# Patient Record
Sex: FEMALE | Race: WHITE | Employment: OTHER | URBAN - METROPOLITAN AREA
[De-identification: names, ages, dates, MRNs, and addresses within clinical notes are randomized per-mention and may not be internally consistent; named-entity substitution may affect disease eponyms.]

---

## 2018-12-17 ENCOUNTER — OFFICE VISIT (OUTPATIENT)
Dept: HEMATOLOGY | Age: 68
End: 2018-12-17

## 2018-12-17 VITALS
TEMPERATURE: 97.6 F | HEIGHT: 59 IN | BODY MASS INDEX: 37.09 KG/M2 | OXYGEN SATURATION: 93 % | DIASTOLIC BLOOD PRESSURE: 76 MMHG | SYSTOLIC BLOOD PRESSURE: 151 MMHG | HEART RATE: 72 BPM | WEIGHT: 184 LBS

## 2018-12-17 DIAGNOSIS — Z95.1 HX OF CABG: ICD-10-CM

## 2018-12-17 DIAGNOSIS — K74.3 PRIMARY BILIARY CHOLANGITIS (HCC): Primary | ICD-10-CM

## 2018-12-17 PROBLEM — G47.30 SLEEP APNEA: Status: ACTIVE | Noted: 2018-12-17

## 2018-12-17 PROBLEM — E78.00 HYPERCHOLESTEROLEMIA: Status: ACTIVE | Noted: 2018-12-17

## 2018-12-17 PROBLEM — L40.9 PSORIASIS: Status: ACTIVE | Noted: 2018-12-17

## 2018-12-17 PROBLEM — R60.9 EDEMA: Status: ACTIVE | Noted: 2018-12-17

## 2018-12-17 PROBLEM — Z95.5 S/P CORONARY ARTERY STENT PLACEMENT: Status: ACTIVE | Noted: 2018-12-17

## 2018-12-17 PROBLEM — Z90.49 HISTORY OF LAPAROSCOPIC CHOLECYSTECTOMY: Status: ACTIVE | Noted: 2018-12-17

## 2018-12-17 PROBLEM — I25.10 CAD (CORONARY ARTERY DISEASE): Status: ACTIVE | Noted: 2018-12-17

## 2018-12-17 PROBLEM — E11.9 DIABETES MELLITUS, TYPE 2 (HCC): Status: ACTIVE | Noted: 2018-12-17

## 2018-12-17 RX ORDER — LATANOPROST 50 UG/ML
1 SOLUTION/ DROPS OPHTHALMIC
COMMUNITY

## 2018-12-17 RX ORDER — METOLAZONE 2.5 MG/1
TABLET ORAL DAILY
COMMUNITY

## 2018-12-17 RX ORDER — GUAIFENESIN 100 MG/5ML
81 LIQUID (ML) ORAL DAILY
COMMUNITY

## 2018-12-17 RX ORDER — ACETAMINOPHEN 325 MG/1
TABLET ORAL
COMMUNITY

## 2018-12-17 RX ORDER — TORSEMIDE 100 MG/1
TABLET ORAL DAILY
COMMUNITY

## 2018-12-17 RX ORDER — MONTELUKAST SODIUM 10 MG/1
10 TABLET ORAL DAILY
COMMUNITY

## 2018-12-17 RX ORDER — CHOLECALCIFEROL (VITAMIN D3) 125 MCG
100 CAPSULE ORAL DAILY
COMMUNITY

## 2018-12-17 RX ORDER — OMEPRAZOLE 40 MG/1
40 CAPSULE, DELAYED RELEASE ORAL DAILY
COMMUNITY

## 2018-12-17 RX ORDER — NITROGLYCERIN 80 MG/1
1 PATCH TRANSDERMAL DAILY
COMMUNITY

## 2018-12-17 RX ORDER — CARVEDILOL 12.5 MG/1
TABLET ORAL 2 TIMES DAILY WITH MEALS
COMMUNITY

## 2018-12-17 RX ORDER — PRAVASTATIN SODIUM 40 MG/1
40 TABLET ORAL
COMMUNITY

## 2018-12-17 RX ORDER — INSULIN LISPRO 100 [IU]/ML
INJECTION, SOLUTION INTRAVENOUS; SUBCUTANEOUS
COMMUNITY

## 2018-12-17 RX ORDER — GABAPENTIN 100 MG/1
CAPSULE ORAL 3 TIMES DAILY
COMMUNITY

## 2018-12-17 RX ORDER — CYPROHEPTADINE HYDROCHLORIDE 4 MG/1
4 TABLET ORAL
Qty: 90 TAB | Refills: 3 | Status: SHIPPED | OUTPATIENT
Start: 2018-12-17 | End: 2019-02-04

## 2018-12-17 RX ORDER — COLESEVELAM 180 1/1
1875 TABLET ORAL 2 TIMES DAILY WITH MEALS
COMMUNITY

## 2018-12-17 RX ORDER — PRASUGREL 10 MG/1
TABLET, FILM COATED ORAL
COMMUNITY

## 2018-12-17 RX ORDER — ALBUTEROL SULFATE 2.5 MG/.5ML
SOLUTION RESPIRATORY (INHALATION) ONCE
COMMUNITY

## 2018-12-17 RX ORDER — URSODIOL 500 MG/1
500 TABLET, FILM COATED ORAL 2 TIMES DAILY
Qty: 60 TAB | Refills: 6 | Status: SHIPPED | OUTPATIENT
Start: 2018-12-17 | End: 2019-02-04

## 2018-12-17 RX ORDER — AMITRIPTYLINE HYDROCHLORIDE 50 MG/1
TABLET, FILM COATED ORAL
COMMUNITY

## 2018-12-17 RX ORDER — HYDROCORTISONE 25 MG/G
OINTMENT TOPICAL 2 TIMES DAILY
COMMUNITY

## 2018-12-17 RX ORDER — GLUCOSAMINE/CHONDR SU A SOD 750-600 MG
TABLET ORAL
COMMUNITY

## 2018-12-17 NOTE — PROGRESS NOTES
[address] [facility], MD, [place], [place], [place]       April Sameul Councilman, JM Bull, ACNP-BC   CECILIO Esquivel NP [facility]    at [address]    [address] [place] [address], 81 [facility], [facility] 22.    [phone]    FAX: [address] [place] [address] Avenue    [address], [address], [address]    [phone]    FAX: [phone]           Patient Care Team:  Earlene Herring MD as PCP - General (Family Practice)  Smiley Ta MD (Gastroenterology)  Diane Arevalo DO (Endocrinology)  Sandro Thornton MD (Internal Medicine)      Problem List  Date Reviewed: 12/17/2018          Codes Class Noted    Primary biliary cholangitis [facility]) ICD-10-CM: K74.3  ICD-9-CM: 571.6  12/17/2018        CAD (coronary artery disease) ICD-10-CM: I25.10  ICD-9-CM: 414.00  12/17/2018        S/P coronary artery stent placement ICD-10-CM: Z95.5  ICD-9-CM: V45.82  12/17/2018        Hx of CABG ICD-10-CM: Z95.1  ICD-9-CM: V45.81  12/17/2018    Overview Signed 12/17/2018  9:41 AM by Argenis Winn MD     2005             Diabetes mellitus, type 2 ([facility] ICD-10-CM: E11.9  ICD-9-CM: 250.00  12/17/2018        Edema ICD-10-CM: R60.9  ICD-9-CM: 782.3  12/17/2018        Psoriasis ICD-10-CM: L40.9  ICD-9-CM: 696.1  12/17/2018        Sleep apnea ICD-10-CM: G47.30  ICD-9-CM: 780.57  12/17/2018        Hypercholesterolemia ICD-10-CM: E78.00  ICD-9-CM: 272.0  12/17/2018        History of laparoscopic cholecystectomy ICD-10-CM: Z90.49  ICD-9-CM: V45.89  12/17/2018                The clinicians listed above have asked me to see Eileen Ling in consultation regarding Primary Biliary Cholangitis.   All medical records sent by the referring physicians were reviewed including imaging studies and pathology. The patient is a 77 y.o.  female who was noted to have abnormalities in alkaline phosphatase in 11/2018. Serologic evaluation for markers of chronic liver disease was positive for AMA,     The most recent imaging of the liver was Ultrasound performed in 11/2018. Results suggest fatty liver disease. A liver biopsy was performed in 11/2018. This demonstrated mild inflammation with bile duct changes consistent with PBC Steatosis and portal fibrosis. The patient has other extrahepatic autoimmune disorders including: psoriasis. The patient has not received any treatment to date. The patient notes fatigue, itching, pain in the right side over the liver,     The patient has not experienced nausea, vomiting,     The patient has Severe limitations in functional activities which can be attributed to other medical problems that are not related to the liver disease. ASSESSMENT AND PLAN:  Primary Biliary Cholangitis  The diagnosis is based upon serology and an elevation in ALP. A liver biopsy has not been performed in 11/2018. This does have evidence of mild bile duct changes but no fibrosis. Liver transaminases are normal.  ALP is elevated. Liver function is normal.  Serum albumin is depressed. The platelet count is normal.      Discussed the pros and cons of treatment. Given her age of 77 years and that there is only mild inflammaiton, bile duct and no fibrosis it is highly likely that [facility] will have no impact on hr life expectancy. However, she feels symptomatic with itching. Will therefore proceed with treatment. The patient will be started on UDAY at a dose of 1000 mg every day. The side effects of UDAY including a 2% risk of itching and a 2% risk of diarrhea were discussed. The need to assess liver fibrosis was discussed.   Sheer wave elastography can assess liver fibrosis and determine if a patient has advanced fibrosis or cirrhosis without the need for liver biopsy. Sheer wave elastography is now available at [facility]. This will be scheduled. Elastography can be repeated annually to assess for fibrosis progression and need for treatment of the liver disorder. Itching   This is secondary to the underlying liver disease. This will be treated with Periactin up to TID as needed  UDAY will be used to treat PBC but this dies not typically help itching. Hypercholestolemia  This is a common extrahepatic manifestation of PBC. In general patients with PBC do and elevated serum cholesterol not have an increased risk for CAD despite the elevation in cholesterol. Treatment of hypercholesterolemia with statins is appropriate if clinically indicated. Screening for Hepatocellular Carcinoma  HCC screening is not necessary if the patient has no evidence of cirrhosis. Treatment of other medical problems in patients with chronic liver disease  There are no contraindications for the patient to take any medications that are necessary for treatment of other medical issues. The patient does not consume alcohol daily. Normal doses of acetaminophen can be used for pain as needed. Normal doses of acetaminophen as recommended on the label are not hepatotoxic, even in patients with cirrhosis. Counseling for alcohol in patients with chronic liver disease  The patient was counseled regarding alcohol consumption and the effect of alcohol on chronic liver disease. The patient does not consume any significant amount of alcohol. Vaccinations   The need for vaccination against viral hepatitis A will be assessed with serologic and instituted as appropriate. Routine vaccinations against other bacterial and viral agents can be performed as indicated. Annual flu vaccination should be administered if indicated.       ALLERGIES  Allergies   Allergen Reactions    Penicillins Other (comments)     Heachaches    Prozac [Fluoxetine] Hives       MEDICATIONS  Current Outpatient Medications   Medication Sig    albuterol sulfate (PROVENTIL;VENTOLIN) 2.5 mg/0.5 mL nebu nebulizer solution by Nebulization route once.  amitriptyline (ELAVIL) 50 mg tablet Take  by mouth nightly.  carvedilol (COREG) 12.5 mg tablet Take  by mouth two (2) times daily (with meals).  prasugrel (EFFIENT) 10 mg tablet Take  by mouth.  gabapentin (NEURONTIN) 100 mg capsule Take  by mouth three (3) times daily.  insulin lispro (HUMALOG U-100 INSULIN) 100 unit/mL injection by SubCUTAneous route.  hydrocortisone (HYTONE) 2.5 % ointment Apply  to affected area two (2) times a day. use thin layer    latanoprost (XALATAN) 0.005 % ophthalmic solution Administer 1 Drop to both eyes nightly.  metOLazone (ZAROXOLYN) 2.5 mg tablet Take  by mouth daily.  montelukast (SINGULAIR) 10 mg tablet Take 10 mg by mouth daily.  nitroglycerin (NITRODUR) 0.4 mg/hr 1 Patch by TransDERmal route daily.  omeprazole (PRILOSEC) 40 mg capsule Take 40 mg by mouth daily.  pravastatin (PRAVACHOL) 40 mg tablet Take 40 mg by mouth nightly.  torsemide (DEMADEX) 100 mg tablet Take  by mouth daily.  sub-q insulin device, 40 unit (VGO 40) arnulfo by Does Not Apply route.  colesevelam (WELCHOL) 625 mg tablet Take 1,875 mg by mouth two (2) times daily (with meals).  aspirin 81 mg chewable tablet Take 81 mg by mouth daily.  Biotin 2,500 mcg cap Take  by mouth.  co-enzyme Q-10 (CO Q-10) 100 mg capsule Take 100 mg by mouth daily.  acetaminophen (TYLENOL) 325 mg tablet Take  by mouth every four (4) hours as needed for Pain. No current facility-administered medications for this visit. SYSTEM REVIEW NOT RELATED TO LIVER DISEASE OR REVIEWED ABOVE:  Constitution systems: Negative for fever, chills, weight gain, weight loss. Eyes: Negative for visual changes. ENT: Negative for sore throat, painful swallowing. Respiratory: Negative for cough, hemoptysis, SOB. Cardiology: Negative for chest pain, palpitations. GI:  Negative for constipation or diarrhea. : Negative for urinary frequency, dysuria, hematuria, nocturia. Skin: Negative for rash. Hematology: Negative for easy bruising, blood clots. Musculo-skelatal: Negative for back pain, muscle pain, weakness. Neurologic: Negative for headaches, dizziness, vertigo, memory problems not related to HE. Psychology: Negative for anxiety, depression. FAMILY HISTORY:  The father is  of suicide. The mother is  of dementia. There is no family history of liver disease. SOCIAL HISTORY:  The patient is . The patient has 1 child and 4 grandchildren. The patient stopped using tobacco products in . The patient has never consumed significant amounts of alcohol. The patient used to work as  at [employer]. PHYSICAL EXAMINATION:  Visit Vitals  /76   Pulse 72   Temp 97.6 °F (36.4 °C) (Tympanic)   Ht 4' 11\" (1.499 m)   Wt 184 lb (83.5 kg)   SpO2 93%   BMI 37.16 kg/m²     General: No acute distress. Eyes: Sclera anicteric. ENT: No oral lesions. Thyroid normal.  Nodes: No adenopathy. Skin: Spider angiomata on chest and back. No jaundice. Patches of psoriasis on hands and elsewhwere  Respiratory: Lungs clear to auscultation. Cardiovascular: Regular heart rate. No murmurs. No JVD. Abdomen: Soft non-tender. Liver size normal to percussion/palpation. Spleen not palpable. No obvious ascites. Extremities: No edema. No muscle wasting. No gross arthritic changes. Neurologic: Alert and oriented. Cranial nerves grossly intact. No asterixis. LABORATORY STUDIES:  From 2018  AST/ALT/ALP/T Bili/ALB:  //176/0.3/3.4  BUN/CREAT:  20/0.9    SEROLOGIES:  2018. HBsAntigen negative, anti-HCV negative, Ferritin 197, iron saturation 23%, LORE negative, ASMA negative, AMA   Positive, alpha-1-antitrypsin 167, A1AT phenotype MM. LIVER HISTOLOGY:  2018. Moderate steatosis, Rare ductal intraepithelial lymphocytosis. On fibrosis. Biopsy slides not reviewed by MLS. ENDOSCOPIC PROCEDURES:  Not available or performed    RADIOLOGY:  11/2018. Ultrasound of liver. Echogenic consistent with chronic liver disease. No liver mass lesions. No dilated bile ducts. No ascites. OTHER TESTING:  Not available or performed    FOLLOW-UP:  All of the issues listed above in the Assessment and Plan were discussed with the patient. All questions were answered. The patient expressed a clear understanding of the above. [address] [address] in 4 weeks for elastography To assess the effects and tolerability of UDAY, to review all data and determine the treatment plan.       [place]  [address]  4 [facility], [address] [place] [address], [address] [place] [address] - [address]  12 [facility]

## 2019-01-08 DIAGNOSIS — K74.3 PRIMARY BILIARY CHOLANGITIS (HCC): Primary | ICD-10-CM

## 2019-01-08 NOTE — PROGRESS NOTES
Received email from Juli Mccray, Shola Drake 125 care coordinator stating that she isn't able to schedule this patient due to the expiration date of the order being too soon. Cancelled old order and reordered ABD US w/ elastography.

## 2019-02-04 ENCOUNTER — HOSPITAL ENCOUNTER (OUTPATIENT)
Dept: ULTRASOUND IMAGING | Age: 69
Discharge: HOME | End: 2019-02-04
Attending: INTERNAL MEDICINE
Payer: MEDICARE

## 2019-02-04 ENCOUNTER — OFFICE VISIT (OUTPATIENT)
Dept: HEMATOLOGY | Age: 69
End: 2019-02-04

## 2019-02-04 ENCOUNTER — HOSPITAL ENCOUNTER (OUTPATIENT)
Dept: LAB | Age: 69
Discharge: HOME | End: 2019-02-04
Payer: MEDICARE

## 2019-02-04 VITALS
DIASTOLIC BLOOD PRESSURE: 67 MMHG | WEIGHT: 187 LBS | TEMPERATURE: 96.9 F | RESPIRATION RATE: 16 BRPM | HEIGHT: 59 IN | HEART RATE: 83 BPM | BODY MASS INDEX: 37.7 KG/M2 | SYSTOLIC BLOOD PRESSURE: 151 MMHG | OXYGEN SATURATION: 92 %

## 2019-02-04 DIAGNOSIS — K74.3 PRIMARY BILIARY CHOLANGITIS (HCC): ICD-10-CM

## 2019-02-04 DIAGNOSIS — Z11.59 ENCOUNTER FOR SCREENING FOR OTHER VIRAL DISEASES: ICD-10-CM

## 2019-02-04 DIAGNOSIS — K74.3 PRIMARY BILIARY CHOLANGITIS (HCC): Primary | ICD-10-CM

## 2019-02-04 PROBLEM — E66.01 SEVERE OBESITY (HCC): Status: ACTIVE | Noted: 2019-02-04

## 2019-02-04 LAB
ALBUMIN SERPL-MCNC: 2.5 G/DL (ref 3.4–5)
ALBUMIN/GLOB SERPL: 0.6 {RATIO} (ref 0.8–1.7)
ALP SERPL-CCNC: 195 U/L (ref 45–117)
ALT SERPL-CCNC: 24 U/L (ref 13–56)
ANION GAP SERPL CALC-SCNC: 6 MMOL/L (ref 3–18)
AST SERPL-CCNC: 17 U/L (ref 15–37)
BASOPHILS # BLD: 0.1 K/UL (ref 0–0.1)
BASOPHILS NFR BLD: 1 % (ref 0–2)
BILIRUB DIRECT SERPL-MCNC: 0.1 MG/DL (ref 0–0.2)
BILIRUB SERPL-MCNC: 0.3 MG/DL (ref 0.2–1)
BUN SERPL-MCNC: 22 MG/DL (ref 7–18)
BUN/CREAT SERPL: 23 (ref 12–20)
CALCIUM SERPL-MCNC: 9.1 MG/DL (ref 8.5–10.1)
CHLORIDE SERPL-SCNC: 100 MMOL/L (ref 100–108)
CO2 SERPL-SCNC: 33 MMOL/L (ref 21–32)
CREAT SERPL-MCNC: 0.95 MG/DL (ref 0.6–1.3)
DIFFERENTIAL METHOD BLD: ABNORMAL
EOSINOPHIL # BLD: 0.2 K/UL (ref 0–0.4)
EOSINOPHIL NFR BLD: 2 % (ref 0–5)
ERYTHROCYTE [DISTWIDTH] IN BLOOD BY AUTOMATED COUNT: 14.1 % (ref 11.6–14.5)
GLOBULIN SER CALC-MCNC: 4.3 G/DL (ref 2–4)
GLUCOSE SERPL-MCNC: 272 MG/DL (ref 74–99)
HCT VFR BLD AUTO: 39.1 % (ref 35–45)
HGB BLD-MCNC: 12 G/DL (ref 12–16)
LYMPHOCYTES # BLD: 2.2 K/UL (ref 0.9–3.6)
LYMPHOCYTES NFR BLD: 25 % (ref 21–52)
MCH RBC QN AUTO: 27.5 PG (ref 24–34)
MCHC RBC AUTO-ENTMCNC: 30.7 G/DL (ref 31–37)
MCV RBC AUTO: 89.5 FL (ref 74–97)
MONOCYTES # BLD: 0.6 K/UL (ref 0.05–1.2)
MONOCYTES NFR BLD: 7 % (ref 3–10)
NEUTS SEG # BLD: 6 K/UL (ref 1.8–8)
NEUTS SEG NFR BLD: 65 % (ref 40–73)
PLATELET # BLD AUTO: 266 K/UL (ref 135–420)
PMV BLD AUTO: 11 FL (ref 9.2–11.8)
POTASSIUM SERPL-SCNC: 4.3 MMOL/L (ref 3.5–5.5)
PROT SERPL-MCNC: 6.8 G/DL (ref 6.4–8.2)
RBC # BLD AUTO: 4.37 M/UL (ref 4.2–5.3)
SODIUM SERPL-SCNC: 139 MMOL/L (ref 136–145)
WBC # BLD AUTO: 9 K/UL (ref 4.6–13.2)

## 2019-02-04 PROCEDURE — 87340 HEPATITIS B SURFACE AG IA: CPT

## 2019-02-04 PROCEDURE — 80076 HEPATIC FUNCTION PANEL: CPT

## 2019-02-04 PROCEDURE — 86706 HEP B SURFACE ANTIBODY: CPT

## 2019-02-04 PROCEDURE — 80048 BASIC METABOLIC PNL TOTAL CA: CPT

## 2019-02-04 PROCEDURE — 86708 HEPATITIS A ANTIBODY: CPT

## 2019-02-04 PROCEDURE — 85025 COMPLETE CBC W/AUTO DIFF WBC: CPT

## 2019-02-04 PROCEDURE — 76981 USE PARENCHYMA: CPT

## 2019-02-04 PROCEDURE — 86704 HEP B CORE ANTIBODY TOTAL: CPT

## 2019-02-04 PROCEDURE — 36415 COLL VENOUS BLD VENIPUNCTURE: CPT

## 2019-02-04 RX ORDER — POTASSIUM CHLORIDE 750 MG/1
TABLET, FILM COATED, EXTENDED RELEASE ORAL
COMMUNITY

## 2019-02-04 NOTE — PROGRESS NOTES
1. Have you been to the ER, urgent care clinic since your last visit? Hospitalized since your last visit? No    2. Have you seen or consulted any other health care providers outside of the [address] [place] [address] since your last visit? Include any pap smears or colon screening.  Yes 01/2019 Dr. Kanwal Lowery and Dr. Tavo Black

## 2019-02-04 NOTE — PROGRESS NOTES
[address] [facility], MD, [address], [place], [place]       April Verner Balk, JM Powers, [facility] Boris, NP    Ankit Velazquez, CECILIO Bush [facility] [address] 136    at [facility]    217 [facility], [address] [place] [address], [facility] 22.    [phone]    FAX: [address] [place] [address]    at [address], [address], [address]    [phone]    FAX: [phone]     Patient Care Team:  Windy Hill MD as PCP - General (Family Practice)  Lacey Tomas MD (Gastroenterology)  Saritha Tellez DO (Endocrinology)  Jennifer Perkins MD (Internal Medicine)      Problem List  Date Reviewed: 1/1/2019          Codes Class Noted    Severe obesity [facility]) ICD-10-CM: E66.01  ICD-9-CM: 278.01  2/4/2019        Primary biliary cholangitis ([facility] ICD-10-CM: K74.3  ICD-9-CM: 571.6  12/17/2018        CAD (coronary artery disease) ICD-10-CM: I25.10  ICD-9-CM: 414.00  12/17/2018        S/P coronary artery stent placement ICD-10-CM: Z95.5  ICD-9-CM: V45.82  12/17/2018        Hx of CABG ICD-10-CM: Z95.1  ICD-9-CM: V45.81  12/17/2018    Overview Signed 12/17/2018  9:41 AM by Mercedes Ballesteros MD     2007             Diabetes mellitus, type 2 ([facility] ICD-10-CM: E11.9  ICD-9-CM: 250.00  12/17/2018        Edema ICD-10-CM: R60.9  ICD-9-CM: 782.3  12/17/2018        Psoriasis ICD-10-CM: L40.9  ICD-9-CM: 696.1  12/17/2018        Sleep apnea ICD-10-CM: G47.30  ICD-9-CM: 780.57  12/17/2018        Hypercholesterolemia ICD-10-CM: E78.00  ICD-9-CM: 272.0  12/17/2018        History of laparoscopic cholecystectomy ICD-10-CM: Z90.49  ICD-9-CM: V45.89  12/17/2018              Lacho Peace returns to the [address] for management of Primary Biliary Cholangitis.  The active problem list, all pertinent past medical history, medications, liver histology, radiologic findings, and laboratory findings related to the liver disorder were reviewed with the patient. The patient is a 77 y.o.  female who was noted to have abnormalities in alkaline phosphatase in 11/2018. Serologic evaluation for markers of chronic liver disease was positive for AMA. The most recent imaging of the liver was Ultrasound performed in 2/2019. The results suggested fatty liver. A liver biopsy was performed in 11/2018. This demonstrated mild inflammation with bile duct changes consistent with PBC Steatosis and portal fibrosis. Assessment of liver fibrosis was performed with sheer wave elastography at [facility] in 2/2019. The result was E mean: 4.72 kPa which correlates with no fibrosis. The patient has other extrahepatic autoimmune disorders including: psoriasis. The patient has not received any treatment to date. The patient notes fatigue, itching, pain in the right side over the liver. The patient has not experienced nausea, vomiting. The patient has severe limitations in functional activities which can be attributed to other medical problems that are not related to the liver disease. ASSESSMENT AND PLAN:  Primary Biliary Cholangitis  The diagnosis is based upon serology and an elevation in ALP. A liver biopsy has been performed in 11/2018. This does have evidence of mild bile duct changes but no fibrosis. Liver transaminases are normal.  ALP is elevated. Liver function is normal.  Serum albumin is depressed. The platelet count is normal.      Given her age of 77 years and that there is only mild inflammaiton, bile duct and no fibrosis it is highly likely that [facility] will have no impact on her life expectancy. Patient was prescribed UDAY at last visit however she states she decided not to take it. The need to assess liver fibrosis was discussed.   Sheer wave elastography can assess liver fibrosis and determine if a patient has advanced fibrosis or cirrhosis without the need for liver biopsy. Sheer wave elastography is now available at [address]. Elastography can be repeated annually to assess for fibrosis progression and need for treatment of the liver disorder. Itching   This is secondary to the underlying liver disease. The patient states she has not used the periactin for itching as prescribed at last visit. Hypercholestolemia  This is a common extrahepatic manifestation of PBC. In general patients with PBC have an elevated serum cholesterol but do not have an increased risk for CAD despite the elevation in cholesterol. Treatment of hypercholesterolemia with statins is appropriate if clinically indicated. Screening for Hepatocellular Carcinoma  HCC screening is not necessary if the patient has no evidence of cirrhosis. Treatment of other medical problems in patients with chronic liver disease  There are no contraindications for the patient to take any medications that are necessary for treatment of other medical issues. The patient does not consume alcohol daily. Normal doses of acetaminophen can be used for pain as needed. Normal doses of acetaminophen as recommended on the label are not hepatotoxic, even in patients with cirrhosis. Counseling for alcohol in patients with chronic liver disease  The patient was counseled regarding alcohol consumption and the effect of alcohol on chronic liver disease. The patient does not consume any significant amount of alcohol. Vaccinations   The need for vaccination against viral hepatitis A will be assessed with serologic and instituted as appropriate. Routine vaccinations against other bacterial and viral agents can be performed as indicated. Annual flu vaccination should be administered if indicated.     ALLERGIES  Allergies   Allergen Reactions    Penicillins Other (comments)     Heachaches    Prozac [Fluoxetine] Hives       MEDICATIONS  Current Outpatient Medications   Medication Sig    albuterol sulfate (PROVENTIL;VENTOLIN) 2.5 mg/0.5 mL nebu nebulizer solution by Nebulization route once.  amitriptyline (ELAVIL) 50 mg tablet Take  by mouth nightly.  carvedilol (COREG) 12.5 mg tablet Take  by mouth two (2) times daily (with meals).  prasugrel (EFFIENT) 10 mg tablet Take  by mouth.  gabapentin (NEURONTIN) 100 mg capsule Take  by mouth three (3) times daily.  insulin lispro (HUMALOG U-100 INSULIN) 100 unit/mL injection by SubCUTAneous route.  hydrocortisone (HYTONE) 2.5 % ointment Apply  to affected area two (2) times a day. use thin layer    latanoprost (XALATAN) 0.005 % ophthalmic solution Administer 1 Drop to both eyes nightly.  metOLazone (ZAROXOLYN) 2.5 mg tablet Take  by mouth daily.  montelukast (SINGULAIR) 10 mg tablet Take 10 mg by mouth daily.  nitroglycerin (NITRODUR) 0.4 mg/hr 1 Patch by TransDERmal route daily.  omeprazole (PRILOSEC) 40 mg capsule Take 40 mg by mouth daily.  pravastatin (PRAVACHOL) 40 mg tablet Take 40 mg by mouth nightly.  torsemide (DEMADEX) 100 mg tablet Take  by mouth daily.  sub-q insulin device, 40 unit (VGO 40) arnulfo by Does Not Apply route.  colesevelam (WELCHOL) 625 mg tablet Take 1,875 mg by mouth two (2) times daily (with meals).  aspirin 81 mg chewable tablet Take 81 mg by mouth daily.  Biotin 2,500 mcg cap Take  by mouth.  co-enzyme Q-10 (CO Q-10) 100 mg capsule Take 100 mg by mouth daily.  acetaminophen (TYLENOL) 325 mg tablet Take  by mouth every four (4) hours as needed for Pain.  cyproheptadine (PERIACTIN) 4 mg tablet Take 1 Tab by mouth three (3) times daily as needed for up to 90 days.  ursodiol (UDAY FORTE) 500 mg tablet Take 1 Tab by mouth two (2) times a day. No current facility-administered medications for this visit.         SYSTEM REVIEW NOT RELATED TO LIVER DISEASE OR REVIEWED ABOVE:  Constitution systems: Negative for fever, chills, weight gain, weight loss. Eyes: Negative for visual changes. ENT: Negative for sore throat, painful swallowing. Respiratory: Negative for cough, hemoptysis, SOB. Cardiology: Negative for chest pain, palpitations. GI:  Negative for constipation or diarrhea. : Negative for urinary frequency, dysuria, hematuria, nocturia. Skin: Negative for rash. Hematology: Negative for easy bruising, blood clots. Musculo-skelatal: Negative for back pain, muscle pain, weakness. Neurologic: Negative for headaches, dizziness, vertigo, memory problems not related to HE. Psychology: Negative for anxiety, depression. FAMILY HISTORY:  The father  of suicide. The mother  of dementia. There is no family history of liver disease. SOCIAL HISTORY:  The patient is . The patient has 1 child and 4 grandchildren. The patient stopped using tobacco products in . The patient has never consumed significant amounts of alcohol. The patient used to work as  at [employer]. PHYSICAL EXAMINATION:  Visit Vitals  /67 (BP 1 Location: Right arm, BP Patient Position: Sitting)   Pulse 83   Temp 96.9 °F (36.1 °C)   Resp 16   Ht 4' 11\" (1.499 m)   Wt 187 lb (84.8 kg)   SpO2 92%   BMI 37.77 kg/m²     General: No acute distress. Eyes: Sclera anicteric. ENT: No oral lesions. Thyroid normal.  Nodes: No adenopathy. Skin: Spider angiomata on chest and back. No jaundice. Patches of psoriasis on hands and elsewhwere  Respiratory: Lungs clear to auscultation. Cardiovascular: Regular heart rate. No murmurs. No JVD. Abdomen: Soft non-tender. Liver size normal to percussion/palpation. Spleen not palpable. No obvious ascites. Extremities: No edema. No muscle wasting. No gross arthritic changes. Neurologic: Alert and oriented. Cranial nerves grossly intact. No asterixis.     LABORATORY STUDIES:  From 11/2018  AST/ALT/ALP/T Bili/ALB:  12/13/176/0.3/3.4  BUN/CREAT:  20/0.9    SEROLOGIES:  11/2018. HBsAntigen negative, anti-HCV negative, Ferritin 197, iron saturation 23%, LORE negative, ASMA negative, AMA Positive, alpha-1-antitrypsin 167, A1AT phenotype MM. LIVER HISTOLOGY:  11/2018. Moderate steatosis, Rare ductal intraepithelial lymphocytosis. On fibrosis. Biopsy slides not reviewed by MLS.  2/2019. Sheer wave elastography performed at [facility]. EkPa was E mean 4.72, E median 4.88 E Std deviation 0.67. The results suggested a fibrosis level of F0. ENDOSCOPIC PROCEDURES:  Not available or performed    RADIOLOGY:  11/2018. Ultrasound of liver. Echogenic consistent with chronic liver disease. No liver mass lesions. No dilated bile ducts. No ascites. 2/2019. Ultrasound of liver. Echogenic consistent with chronic liver disease. No liver mass lesions. No dilated bile ducts. No ascites. OTHER TESTING:  Not available or performed    FOLLOW-UP:  All of the issues listed above in the Assessment and Plan were discussed with the patient. All questions were answered. The patient expressed a clear understanding of the above. [facility] 87 in 1 year for repeat elastography on same day as office visit. The patient prefers labs be monitored by her PCP closer to her home.       Arline Berry, CHELSEANP-BC  [address] Liver [place] of [address], [facility] [address]  [address] [place] [address], [address]  [phone]

## 2019-02-05 LAB
HAV AB SER QL IA: POSITIVE
HBV CORE AB SERPL QL IA: NEGATIVE
HBV SURFACE AB SER QL IA: NEGATIVE
HBV SURFACE AB SERPL IA-ACNC: <3.1 MIU/ML
HBV SURFACE AG SER QL: <0.1 INDEX
HBV SURFACE AG SER QL: NEGATIVE
HEP BS AB COMMENT,HBSAC: ABNORMAL